# Patient Record
Sex: FEMALE | URBAN - METROPOLITAN AREA
[De-identification: names, ages, dates, MRNs, and addresses within clinical notes are randomized per-mention and may not be internally consistent; named-entity substitution may affect disease eponyms.]

---

## 2017-01-03 ENCOUNTER — APPOINTMENT (RX ONLY)
Dept: URBAN - METROPOLITAN AREA SURGERY 2 | Facility: SURGERY | Age: 72
Setting detail: DERMATOLOGY
End: 2017-01-03

## 2017-01-03 DIAGNOSIS — E3611 ACCIDENTAL PUNCTURE OR LACERATION DURING A PROCEDURE, NOT ELSEWHERE CLASSIFIED: ICD-10-CM

## 2017-01-03 DIAGNOSIS — I9751 ACCIDENTAL PUNCTURE OR LACERATION DURING A PROCEDURE, NOT ELSEWHERE CLASSIFIED: ICD-10-CM

## 2017-01-03 DIAGNOSIS — N9972 ACCIDENTAL PUNCTURE OR LACERATION DURING A PROCEDURE, NOT ELSEWHERE CLASSIFIED: ICD-10-CM

## 2017-01-03 DIAGNOSIS — H9532 ACCIDENTAL PUNCTURE OR LACERATION DURING A PROCEDURE, NOT ELSEWHERE CLASSIFIED: ICD-10-CM

## 2017-01-03 DIAGNOSIS — K9172 ACCIDENTAL PUNCTURE OR LACERATION DURING A PROCEDURE, NOT ELSEWHERE CLASSIFIED: ICD-10-CM

## 2017-01-03 DIAGNOSIS — H59229 ACCIDENTAL PUNCTURE OR LACERATION DURING A PROCEDURE, NOT ELSEWHERE CLASSIFIED: ICD-10-CM

## 2017-01-03 DIAGNOSIS — D7812 ACCIDENTAL PUNCTURE OR LACERATION DURING A PROCEDURE, NOT ELSEWHERE CLASSIFIED: ICD-10-CM

## 2017-01-03 DIAGNOSIS — E3612 ACCIDENTAL PUNCTURE OR LACERATION DURING A PROCEDURE, NOT ELSEWHERE CLASSIFIED: ICD-10-CM

## 2017-01-03 DIAGNOSIS — M96820 ACCIDENTAL PUNCTURE OR LACERATION DURING A PROCEDURE, NOT ELSEWHERE CLASSIFIED: ICD-10-CM

## 2017-01-03 DIAGNOSIS — L7612 ACCIDENTAL PUNCTURE OR LACERATION DURING A PROCEDURE, NOT ELSEWHERE CLASSIFIED: ICD-10-CM

## 2017-01-03 DIAGNOSIS — I9752 ACCIDENTAL PUNCTURE OR LACERATION DURING A PROCEDURE, NOT ELSEWHERE CLASSIFIED: ICD-10-CM

## 2017-01-03 DIAGNOSIS — J9572 ACCIDENTAL PUNCTURE OR LACERATION DURING A PROCEDURE, NOT ELSEWHERE CLASSIFIED: ICD-10-CM

## 2017-01-03 DIAGNOSIS — G9748 ACCIDENTAL PUNCTURE OR LACERATION DURING A PROCEDURE, NOT ELSEWHERE CLASSIFIED: ICD-10-CM

## 2017-01-03 DIAGNOSIS — H59219 ACCIDENTAL PUNCTURE OR LACERATION DURING A PROCEDURE, NOT ELSEWHERE CLASSIFIED: ICD-10-CM

## 2017-01-03 DIAGNOSIS — H9531 ACCIDENTAL PUNCTURE OR LACERATION DURING A PROCEDURE, NOT ELSEWHERE CLASSIFIED: ICD-10-CM

## 2017-01-03 DIAGNOSIS — L7611 ACCIDENTAL PUNCTURE OR LACERATION DURING A PROCEDURE, NOT ELSEWHERE CLASSIFIED: ICD-10-CM

## 2017-01-03 DIAGNOSIS — N9971 ACCIDENTAL PUNCTURE OR LACERATION DURING A PROCEDURE, NOT ELSEWHERE CLASSIFIED: ICD-10-CM

## 2017-01-03 DIAGNOSIS — T888XXA ACCIDENTAL PUNCTURE OR LACERATION DURING A PROCEDURE, NOT ELSEWHERE CLASSIFIED: ICD-10-CM

## 2017-01-03 DIAGNOSIS — J9571 ACCIDENTAL PUNCTURE OR LACERATION DURING A PROCEDURE, NOT ELSEWHERE CLASSIFIED: ICD-10-CM

## 2017-01-03 DIAGNOSIS — D7811 ACCIDENTAL PUNCTURE OR LACERATION DURING A PROCEDURE, NOT ELSEWHERE CLASSIFIED: ICD-10-CM

## 2017-01-03 DIAGNOSIS — G9749 ACCIDENTAL PUNCTURE OR LACERATION DURING A PROCEDURE, NOT ELSEWHERE CLASSIFIED: ICD-10-CM

## 2017-01-03 DIAGNOSIS — K9171 ACCIDENTAL PUNCTURE OR LACERATION DURING A PROCEDURE, NOT ELSEWHERE CLASSIFIED: ICD-10-CM

## 2017-01-03 DIAGNOSIS — M96821 ACCIDENTAL PUNCTURE OR LACERATION DURING A PROCEDURE, NOT ELSEWHERE CLASSIFIED: ICD-10-CM

## 2017-01-03 PROBLEM — S01.81XD: Status: ACTIVE | Noted: 2017-01-03

## 2017-01-03 PROCEDURE — ? POST-OP CHECK

## 2017-01-03 PROCEDURE — ? COUNSELING - LACERATION

## 2017-01-03 PROCEDURE — 99024 POSTOP FOLLOW-UP VISIT: CPT

## 2017-01-03 PROCEDURE — ? PATIENT SPECIFIC COUNSELING

## 2017-01-03 ASSESSMENT — LOCATION DETAILED DESCRIPTION DERM: LOCATION DETAILED: RIGHT MEDIAL FOREHEAD

## 2017-01-03 ASSESSMENT — LOCATION SIMPLE DESCRIPTION DERM: LOCATION SIMPLE: RIGHT FOREHEAD

## 2017-01-03 ASSESSMENT — LOCATION ZONE DERM: LOCATION ZONE: FACE

## 2017-01-03 NOTE — PROCEDURE: PATIENT SPECIFIC COUNSELING
Other (Free Text): Patient advised she can start using BioCorneum in 1 week, and use for a 6 week period, 6 week follow up
Detail Level: Simple

## 2017-02-20 ENCOUNTER — APPOINTMENT (RX ONLY)
Dept: URBAN - METROPOLITAN AREA SURGERY 2 | Facility: SURGERY | Age: 72
Setting detail: DERMATOLOGY
End: 2017-02-20

## 2017-02-20 DIAGNOSIS — L90.5 SCAR CONDITIONS AND FIBROSIS OF SKIN: ICD-10-CM

## 2017-02-20 PROCEDURE — ? COUNSELING - SCAR

## 2017-02-20 PROCEDURE — ? LIMELIGHT IPL

## 2017-02-20 PROCEDURE — ? PATIENT SPECIFIC COUNSELING

## 2017-02-20 ASSESSMENT — LOCATION SIMPLE DESCRIPTION DERM
LOCATION SIMPLE: LEFT FOREHEAD
LOCATION SIMPLE: NOSE

## 2017-02-20 ASSESSMENT — LOCATION DETAILED DESCRIPTION DERM
LOCATION DETAILED: NASAL SUPRATIP
LOCATION DETAILED: LEFT SUPERIOR FOREHEAD

## 2017-02-20 ASSESSMENT — LOCATION ZONE DERM
LOCATION ZONE: FACE
LOCATION ZONE: NOSE

## 2017-02-20 NOTE — PROCEDURE: LIMELIGHT IPL
Total Pulses: 3
Anesthesia Type: 1% lidocaine with epinephrine
Spot Size(S): 10 x 30 mm
Detail Level: Simple
Anesthesia Volume In Cc: 0
Treatment Number: 4
Consent: Written consent obtained, risks reviewed including but not limited to crusting, scabbing, blistering, scarring, darker or lighter pigmentary change, bruising, and/or incomplete response.
External Cooling Fan Speed: 5
Sun Mode: On
Treated Area: small area
Number Of Passes: 2
Repetition Rate: 2 Hz
Coolin C
Treatment Number: 1
Program: A
Fluence: 21

## 2017-02-20 NOTE — PROCEDURE: PATIENT SPECIFIC COUNSELING
Other (Free Text): Informed patient that the scar on nose and forehead are improving.\\nDiscussed ILK injections into the nose to soften tissue.
Detail Level: Simple

## 2019-01-22 ENCOUNTER — APPOINTMENT (RX ONLY)
Dept: URBAN - METROPOLITAN AREA CLINIC 12 | Facility: CLINIC | Age: 74
Setting detail: DERMATOLOGY
End: 2019-01-22

## 2019-01-22 DIAGNOSIS — Z41.1 ENCOUNTER FOR COSMETIC SURGERY: ICD-10-CM

## 2019-01-22 PROBLEM — J30.1 ALLERGIC RHINITIS DUE TO POLLEN: Status: ACTIVE | Noted: 2019-01-22

## 2019-01-22 PROBLEM — E03.9 HYPOTHYROIDISM, UNSPECIFIED: Status: ACTIVE | Noted: 2019-01-22

## 2019-01-22 PROCEDURE — ? CONSULTATION - AESTHETIC FACIAL DEFORMITY

## 2019-01-22 PROCEDURE — ? PATIENT SPECIFIC COUNSELING

## 2019-01-22 ASSESSMENT — LOCATION DETAILED DESCRIPTION DERM
LOCATION DETAILED: LEFT LATERAL SUPERIOR EYELID
LOCATION DETAILED: RIGHT LATERAL SUPERIOR EYELID
LOCATION DETAILED: LEFT INFERIOR ANTERIOR NECK

## 2019-01-22 ASSESSMENT — LOCATION SIMPLE DESCRIPTION DERM
LOCATION SIMPLE: RIGHT SUPERIOR EYELID
LOCATION SIMPLE: LEFT SUPERIOR EYELID
LOCATION SIMPLE: LEFT ANTERIOR NECK

## 2019-01-22 ASSESSMENT — LOCATION ZONE DERM
LOCATION ZONE: EYELID
LOCATION ZONE: NECK

## 2019-01-22 NOTE — PROCEDURE: PATIENT SPECIFIC COUNSELING
Other (Free Text): Discussed with patient that she has heavy eyelids and she is a good candidate for an upper lid bleph. Explained to patient that this procedure will make her look softer and more awake. \\nIt was also recommended to the patient that the glabella muscle be loosened, and her own tissue will be inserted to make her look less silva. \\nDiscussed with patient that this procedure is done in the OR. Also discussed with patient that downtime is 7-10 days and she should expect bruising and swelling.
Detail Level: Detailed
Other (Free Text): Patient is a good candidate for a lower face and neck lift. Explained incisions with patient. Explained risks of surgery with patient as well.\\nMade patient aware that that this procedure is done in the OR, and she will spend one night at the outpatient center.\\nPatient should expect bruising and swelling.\\nAlso discussed with patient that downtime is 10-14 days.\\nDiscussed with patient that she can wear makeup after 2 weeks. \\nPatient can travel 3-4 days by car and 14 days for flying.\\nDana stepped in to give a quote.

## 2019-01-22 NOTE — PROCEDURE: CONSULTATION - AESTHETIC FACIAL DEFORMITY
Send Procedure Quote As Charge: No
Detail Level: Zone
Consultation Charge $ (Use Numbers Only, No Text Please.): 100

## 2019-01-22 NOTE — HPI: FACE (AESTHETIC DEFORMITY, FACE)
How Severe Is It?: moderate
Is This A New Presentation, Or A Follow-Up?: Facial Aesthetic Deformity
Additional History: The patient presents today for an upper and lower blepharoplasty consultation with Dr. Dent.

## 2019-06-27 ENCOUNTER — APPOINTMENT (RX ONLY)
Dept: URBAN - METROPOLITAN AREA CLINIC 12 | Facility: CLINIC | Age: 74
Setting detail: DERMATOLOGY
End: 2019-06-27

## 2019-06-27 DIAGNOSIS — Z41.1 ENCOUNTER FOR COSMETIC SURGERY: ICD-10-CM

## 2019-06-27 PROCEDURE — 99024 POSTOP FOLLOW-UP VISIT: CPT

## 2019-06-27 PROCEDURE — ? NO CHARGE PRE-OP VISIT

## 2019-06-27 PROCEDURE — ? PRE-OP WORKLIST

## 2019-06-27 ASSESSMENT — LOCATION DETAILED DESCRIPTION DERM
LOCATION DETAILED: RIGHT LATERAL SUPERIOR EYELID
LOCATION DETAILED: LEFT MEDIAL INFERIOR PRESEPTAL REGION
LOCATION DETAILED: LEFT MEDIAL SUPERIOR EYELID
LOCATION DETAILED: RIGHT LATERAL INFERIOR EYELID

## 2019-06-27 ASSESSMENT — LOCATION SIMPLE DESCRIPTION DERM
LOCATION SIMPLE: RIGHT SUPERIOR EYELID
LOCATION SIMPLE: LEFT SUPERIOR EYELID
LOCATION SIMPLE: LEFT INFERIOR EYELID
LOCATION SIMPLE: RIGHT INFERIOR EYELID

## 2019-06-27 ASSESSMENT — LOCATION ZONE DERM: LOCATION ZONE: EYELID

## 2019-06-27 NOTE — PROCEDURE: PRE-OP WORKLIST
Detail Level: Simple
Photos Taken?: yes
Surgery Scheduled: Bilateral upper and lower lid blepharoplasty
Date Of Surgery: 7/12/2019
Surgeon: Dr. Dent

## 2019-06-27 NOTE — PROCEDURE: NO CHARGE PRE-OP VISIT
Detail Level: Detailed
For Tracking Purposes Only. Note 86909 Is Retired Code. Use 90318 Or [id number]?: [id number]

## 2019-07-12 ENCOUNTER — APPOINTMENT (RX ONLY)
Dept: URBAN - METROPOLITAN AREA CLINIC 12 | Facility: CLINIC | Age: 74
Setting detail: DERMATOLOGY
End: 2019-07-12

## 2019-07-12 DIAGNOSIS — Z41.1 ENCOUNTER FOR COSMETIC SURGERY: ICD-10-CM

## 2019-07-12 PROCEDURE — ? CHEMICAL PEEL (CUSTOM)

## 2019-07-12 PROCEDURE — ? PATIENT SPECIFIC COUNSELING

## 2019-07-12 PROCEDURE — ? COSMETIC BLEPHAROPLASTY

## 2019-07-12 ASSESSMENT — LOCATION SIMPLE DESCRIPTION DERM
LOCATION SIMPLE: LEFT SUPERIOR EYELID
LOCATION SIMPLE: RIGHT SUPERIOR EYELID
LOCATION SIMPLE: RIGHT CHEEK
LOCATION SIMPLE: LEFT CHEEK

## 2019-07-12 ASSESSMENT — LOCATION DETAILED DESCRIPTION DERM
LOCATION DETAILED: LEFT SUPERIOR CENTRAL MALAR CHEEK
LOCATION DETAILED: LEFT MEDIAL SUPERIOR EYELID
LOCATION DETAILED: RIGHT MEDIAL SUPERIOR EYELID
LOCATION DETAILED: RIGHT SUPERIOR CENTRAL MALAR CHEEK
LOCATION DETAILED: LEFT SUPERIOR MEDIAL MALAR CHEEK
LOCATION DETAILED: LEFT LATERAL SUPERIOR EYELID

## 2019-07-12 ASSESSMENT — LOCATION ZONE DERM
LOCATION ZONE: FACE
LOCATION ZONE: EYELID

## 2019-07-12 NOTE — PROCEDURE: PATIENT SPECIFIC COUNSELING
Detail Level: Simple
Other (Free Text): Patient presents for upper lid blepharoplasty and lower lid blepharoplasty transconjunctival approach. \\nDr. Esai also removed roof fat.

## 2019-07-12 NOTE — PROCEDURE: COSMETIC BLEPHAROPLASTY
Skin Marking: Incisions were marked out with a marking pen approximately 1 mm inferior to the lash line from the level of the inferior punctum and extending laterally from the lateral canthus in a slightly downward direction for approximately 1 cm.
Intro: The patient was prepared and draped in the usual sterile fashion.
Surgeon: Dr. Dent
Skin Marking: The area of eyelid skin to be excised from the upper eyelid was marked out with a marking pen.
Assistant: Enedina Lyn
Skin Closure: simple interrupted
Preparation: A time-out was taken prior to the procedure to identify the patient and surgical sites. An intravenous line was established and cardiac monitors were placed.
Hemostasis: electrocautery
Skin Closure Sutures: 6-0 Prolene
Detail Level: Generalized
Local Anesthesia Volume In Cc: 12
Consent: The risks, benefits, expectations and alternatives of blepharoplasty were discussed in detail with the patient, including, but not limited to, the risks of postoperative infection, bleeding, delayed healing, injury to the globe,  scarring, pain, asymmetry, loss of vision, lagophthalmos, ectropion, entropion, corneal abrasion, possible need for additional procedures, and dissatisfaction with cosmetic outcome. The patient verbalized understanding and nformed consent was obtained.
Estimated Blood Loss (Cc): minimal
Conjunctival Closure Sutures: 6-0 fast absorbing plain gut
Price $ (Use Numbers Only, No Text Please.): 4368

## 2019-07-12 NOTE — PROCEDURE: CHEMICAL PEEL (CUSTOM)
Consent: Prior to the procedure, written consent was obtained and risks, benefits, expectations and alternatives were reviewed, including, but not limited to,  redness, peeling, blistering, pigmentary change, scarring, infection, and pain.
Treatment Number: 1
Number Of Coats: 4
Detail Level: Zone
Prep: The treated area was degreased with pre-peel cleanser, and vaseline was applied for protection of mucous membranes.

## 2019-07-13 ENCOUNTER — APPOINTMENT (RX ONLY)
Dept: URBAN - METROPOLITAN AREA CLINIC 12 | Facility: CLINIC | Age: 74
Setting detail: DERMATOLOGY
End: 2019-07-13

## 2019-07-13 DIAGNOSIS — Z48.89 ENCOUNTER FOR OTHER SPECIFIED SURGICAL AFTERCARE: ICD-10-CM

## 2019-07-13 DIAGNOSIS — Z42.8 ENCOUNTER FOR OTHER PLASTIC AND RECONSTRUCTIVE SURGERY FOLLOWING MEDICAL PROCEDURE OR HEALED INJURY: ICD-10-CM

## 2019-07-13 PROCEDURE — 99024 POSTOP FOLLOW-UP VISIT: CPT

## 2019-07-13 PROCEDURE — ? CONSULTATION - S/P CHEMICAL PEEL

## 2019-07-13 PROCEDURE — ? PATIENT SPECIFIC COUNSELING

## 2019-07-13 PROCEDURE — ? COUNSELING - POST-OP CHECK, BLEPHAROPLASTY

## 2019-07-13 ASSESSMENT — LOCATION DETAILED DESCRIPTION DERM
LOCATION DETAILED: LEFT CENTRAL MALAR CHEEK
LOCATION DETAILED: RIGHT MEDIAL SUPERIOR EYELID
LOCATION DETAILED: LEFT MEDIAL MALAR CHEEK
LOCATION DETAILED: RIGHT MEDIAL MALAR CHEEK
LOCATION DETAILED: LEFT LATERAL SUPERIOR EYELID
LOCATION DETAILED: RIGHT SUPERIOR MEDIAL MALAR CHEEK
LOCATION DETAILED: LEFT MEDIAL SUPERIOR EYELID

## 2019-07-13 ASSESSMENT — LOCATION SIMPLE DESCRIPTION DERM
LOCATION SIMPLE: RIGHT CHEEK
LOCATION SIMPLE: RIGHT SUPERIOR EYELID
LOCATION SIMPLE: LEFT CHEEK
LOCATION SIMPLE: LEFT SUPERIOR EYELID

## 2019-07-13 ASSESSMENT — LOCATION ZONE DERM
LOCATION ZONE: EYELID
LOCATION ZONE: FACE

## 2019-07-13 NOTE — PROCEDURE: PATIENT SPECIFIC COUNSELING
Detail Level: Zone
Other (Free Text): Patient presents s/p blepharoplasty patient states she’s doing well with no concerns. Dr. Dent examined and exp, is that her bruising and swelling is normal and explained that the swelling will get worse tomorrow. Patient was advised to follow up next week for suture removal.
Other (Free Text): Patient present s/ p chemical peel. Patient states she’s doing well. Dr. Dent explained that her bruising and swelling is as expected. Dr. Dent applied the bismuth mud mask around her eyes, and advised that she not was it off, and allow the mud to come off on its own. Patient verbalized understanding.

## 2019-07-19 ENCOUNTER — RX ONLY (OUTPATIENT)
Age: 74
Setting detail: RX ONLY
End: 2019-07-19

## 2019-07-19 ENCOUNTER — APPOINTMENT (RX ONLY)
Dept: URBAN - METROPOLITAN AREA CLINIC 12 | Facility: CLINIC | Age: 74
Setting detail: DERMATOLOGY
End: 2019-07-19

## 2019-07-19 DIAGNOSIS — H10.3 UNSPECIFIED ACUTE CONJUNCTIVITIS: ICD-10-CM

## 2019-07-19 DIAGNOSIS — Z48.89 ENCOUNTER FOR OTHER SPECIFIED SURGICAL AFTERCARE: ICD-10-CM

## 2019-07-19 DIAGNOSIS — Z42.8 ENCOUNTER FOR OTHER PLASTIC AND RECONSTRUCTIVE SURGERY FOLLOWING MEDICAL PROCEDURE OR HEALED INJURY: ICD-10-CM

## 2019-07-19 PROBLEM — H10.33 UNSPECIFIED ACUTE CONJUNCTIVITIS, BILATERAL: Status: ACTIVE | Noted: 2019-07-19

## 2019-07-19 PROCEDURE — ? SUTURE REMOVAL

## 2019-07-19 PROCEDURE — ? CONSULTATION - S/P CHEMICAL PEEL

## 2019-07-19 PROCEDURE — ? PATIENT SPECIFIC COUNSELING

## 2019-07-19 PROCEDURE — 99024 POSTOP FOLLOW-UP VISIT: CPT

## 2019-07-19 PROCEDURE — ?

## 2019-07-19 PROCEDURE — ? COUNSELING - POST-OP CHECK, BLEPHAROPLASTY

## 2019-07-19 RX ORDER — FLUOROMETHOLONE 2.5 MG/ML
SUSPENSION/ DROPS OPHTHALMIC
Qty: 1 | Refills: 0 | Status: ERX | COMMUNITY
Start: 2019-07-19

## 2019-07-19 ASSESSMENT — LOCATION SIMPLE DESCRIPTION DERM
LOCATION SIMPLE: RIGHT SUPERIOR EYELID
LOCATION SIMPLE: RIGHT EYE
LOCATION SIMPLE: LEFT SUPERIOR EYELID
LOCATION SIMPLE: LEFT EYE
LOCATION SIMPLE: LEFT CHEEK
LOCATION SIMPLE: RIGHT CHEEK
LOCATION SIMPLE: GLABELLA

## 2019-07-19 ASSESSMENT — LOCATION DETAILED DESCRIPTION DERM
LOCATION DETAILED: LEFT MEDIAL SUPERIOR EYELID
LOCATION DETAILED: LEFT LATERAL SUPERIOR EYELID
LOCATION DETAILED: LEFT LATERAL SCLERA
LOCATION DETAILED: RIGHT SUPERIOR MEDIAL MALAR CHEEK
LOCATION DETAILED: GLABELLA
LOCATION DETAILED: RIGHT MEDIAL SUPERIOR EYELID
LOCATION DETAILED: LEFT CENTRAL MALAR CHEEK
LOCATION DETAILED: RIGHT MEDIAL MALAR CHEEK
LOCATION DETAILED: RIGHT LATERAL SCLERA
LOCATION DETAILED: LEFT MEDIAL MALAR CHEEK
LOCATION DETAILED: RIGHT LATERAL SUPERIOR EYELID

## 2019-07-19 ASSESSMENT — LOCATION ZONE DERM
LOCATION ZONE: CONJUNCTIVA
LOCATION ZONE: EYELID
LOCATION ZONE: FACE

## 2019-07-19 NOTE — PROCEDURE: PATIENT SPECIFIC COUNSELING
Other (Free Text): Patient presents s/p blepharoplasty patient states she’s doing well with no concerns. Dr. Dent examined and exp, is that her bruising and swelling is normal and explained that the swelling will get worse tomorrow. Patient was advised to follow up next week for suture removal.
Detail Level: Zone
Other (Free Text): CHemosis right eye \\nFml drop 2 gtts each eye bid \\nPatient present s/p chemical peel. Patient states she’s doing well. Dr. Dent explained that her bruising and swelling is as expected. Patient presents for suture removal, and states she would like to discuss how to care for her eyes from her on out. Patient reports no concerns today. Dr. Dent zina ssured patient that the green mask will eventually fall off. Dr. Dent explained that she has chemosis  in her right eye and explained that he he’s going to treat her with an antibiotic eye drop \\n\\Jarett Dent performed a sclerotomy on her left eye. Patient was given a script for pred- forte

## 2019-07-23 ENCOUNTER — APPOINTMENT (RX ONLY)
Dept: URBAN - METROPOLITAN AREA CLINIC 12 | Facility: CLINIC | Age: 74
Setting detail: DERMATOLOGY
End: 2019-07-23

## 2019-07-23 DIAGNOSIS — Z42.8 ENCOUNTER FOR OTHER PLASTIC AND RECONSTRUCTIVE SURGERY FOLLOWING MEDICAL PROCEDURE OR HEALED INJURY: ICD-10-CM

## 2019-07-23 DIAGNOSIS — Z48.89 ENCOUNTER FOR OTHER SPECIFIED SURGICAL AFTERCARE: ICD-10-CM

## 2019-07-23 PROCEDURE — ? SUTURE REMOVAL

## 2019-07-23 PROCEDURE — ? CONSULTATION - S/P CHEMICAL PEEL

## 2019-07-23 PROCEDURE — ? COUNSELING - POST-OP CHECK, BLEPHAROPLASTY

## 2019-07-23 PROCEDURE — ? PATIENT SPECIFIC COUNSELING

## 2019-07-23 PROCEDURE — 99024 POSTOP FOLLOW-UP VISIT: CPT

## 2019-07-23 ASSESSMENT — LOCATION DETAILED DESCRIPTION DERM
LOCATION DETAILED: LEFT LATERAL SUPERIOR EYELID
LOCATION DETAILED: RIGHT SUPERIOR MEDIAL MALAR CHEEK
LOCATION DETAILED: RIGHT MEDIAL MALAR CHEEK
LOCATION DETAILED: LEFT MEDIAL SUPERIOR EYELID
LOCATION DETAILED: RIGHT MEDIAL SUPERIOR EYELID
LOCATION DETAILED: LEFT CENTRAL MALAR CHEEK
LOCATION DETAILED: RIGHT LATERAL SUPERIOR EYELID
LOCATION DETAILED: LEFT MEDIAL MALAR CHEEK

## 2019-07-23 ASSESSMENT — LOCATION ZONE DERM
LOCATION ZONE: EYELID
LOCATION ZONE: FACE

## 2019-07-23 NOTE — PROCEDURE: PATIENT SPECIFIC COUNSELING
Other (Free Text): Patient presents s/p blepharoplasty patient states she’s doing well with no concerns. Dr. Dent examined and exp, is that her bruising and swelling is normal and explained that the swelling will get worse tomorrow. Patient was advised to follow up next week for suture removal.
Detail Level: Zone
Other (Free Text): Patient presents for follow up post upper lid blepharoplasty and croton oil peel. Patient states her left eye hurts to close, patient states it feels tight. Dr. Dent explained that the chemosis in both eyes have improved. Patient was made aware that the tightness is from the swelling which is all normal and will resolve with time. Patient was reassured that she Right on track with her healing. Patient was strongly advised to not stare at the phone or looking at a screen. Patient was educated that a lot of screen time will affect her healing. Patient advised to follow up in 3 weeks.

## 2019-08-13 ENCOUNTER — APPOINTMENT (RX ONLY)
Dept: URBAN - METROPOLITAN AREA CLINIC 12 | Facility: CLINIC | Age: 74
Setting detail: DERMATOLOGY
End: 2019-08-13

## 2019-08-13 DIAGNOSIS — Z42.8 ENCOUNTER FOR OTHER PLASTIC AND RECONSTRUCTIVE SURGERY FOLLOWING MEDICAL PROCEDURE OR HEALED INJURY: ICD-10-CM

## 2019-08-13 DIAGNOSIS — Z48.89 ENCOUNTER FOR OTHER SPECIFIED SURGICAL AFTERCARE: ICD-10-CM

## 2019-08-13 PROCEDURE — ? COUNSELING - POST-OP CHECK, BLEPHAROPLASTY

## 2019-08-13 PROCEDURE — ? CONSULTATION - S/P CHEMICAL PEEL

## 2019-08-13 PROCEDURE — ? PATIENT SPECIFIC COUNSELING

## 2019-08-13 PROCEDURE — 99024 POSTOP FOLLOW-UP VISIT: CPT

## 2019-08-13 ASSESSMENT — LOCATION SIMPLE DESCRIPTION DERM
LOCATION SIMPLE: LEFT SUPERIOR EYELID
LOCATION SIMPLE: RIGHT CHEEK
LOCATION SIMPLE: RIGHT SUPERIOR EYELID
LOCATION SIMPLE: LEFT CHEEK

## 2019-08-13 ASSESSMENT — LOCATION DETAILED DESCRIPTION DERM
LOCATION DETAILED: LEFT SUPERIOR CENTRAL MALAR CHEEK
LOCATION DETAILED: RIGHT MEDIAL MALAR CHEEK
LOCATION DETAILED: LEFT CENTRAL MALAR CHEEK
LOCATION DETAILED: RIGHT SUPERIOR CENTRAL MALAR CHEEK
LOCATION DETAILED: RIGHT MEDIAL SUPERIOR EYELID
LOCATION DETAILED: LEFT LATERAL SUPERIOR EYELID

## 2019-08-13 ASSESSMENT — LOCATION ZONE DERM
LOCATION ZONE: EYELID
LOCATION ZONE: FACE

## 2019-08-13 NOTE — PROCEDURE: PATIENT SPECIFIC COUNSELING
Detail Level: Zone
Other (Free Text): Patient presents for follow up post upper lid blepharoplasty and croton oil peel. Dr. Dent examined and stated that everything looks great. There is still some swelling and some light redness and this with take another couple of months before it is al, gone. Dr. Dent stated that eyes are sensitive to trauma sand surgery and this is normal. Scars are healing well. Patient can RTC in 2 months.

## 2019-10-14 ENCOUNTER — APPOINTMENT (RX ONLY)
Dept: URBAN - METROPOLITAN AREA CLINIC 12 | Facility: CLINIC | Age: 74
Setting detail: DERMATOLOGY
End: 2019-10-14

## 2019-10-14 DIAGNOSIS — Z48.89 ENCOUNTER FOR OTHER SPECIFIED SURGICAL AFTERCARE: ICD-10-CM

## 2019-10-14 DIAGNOSIS — Z42.8 ENCOUNTER FOR OTHER PLASTIC AND RECONSTRUCTIVE SURGERY FOLLOWING MEDICAL PROCEDURE OR HEALED INJURY: ICD-10-CM

## 2019-10-14 PROCEDURE — ? CONSULTATION - S/P CHEMICAL PEEL

## 2019-10-14 PROCEDURE — ? PATIENT SPECIFIC COUNSELING

## 2019-10-14 PROCEDURE — 99024 POSTOP FOLLOW-UP VISIT: CPT

## 2019-10-14 PROCEDURE — ? COUNSELING - POST-OP CHECK, BLEPHAROPLASTY

## 2019-10-14 ASSESSMENT — LOCATION ZONE DERM
LOCATION ZONE: EYELID
LOCATION ZONE: FACE

## 2019-10-14 ASSESSMENT — LOCATION DETAILED DESCRIPTION DERM
LOCATION DETAILED: RIGHT MEDIAL SUPERIOR EYELID
LOCATION DETAILED: LEFT CENTRAL MALAR CHEEK
LOCATION DETAILED: RIGHT SUPERIOR CENTRAL MALAR CHEEK
LOCATION DETAILED: LEFT SUPERIOR CENTRAL MALAR CHEEK
LOCATION DETAILED: LEFT LATERAL SUPERIOR EYELID
LOCATION DETAILED: RIGHT MEDIAL MALAR CHEEK

## 2019-10-14 NOTE — PROCEDURE: PATIENT SPECIFIC COUNSELING
Other (Free Text): Patient presents for follow up post upper lid blepharoplasty and croton oil peel. Dr. Dent examined and stated that everything looks great. Patient is graduated today and can RTC PRN
Detail Level: Zone